# Patient Record
Sex: FEMALE | Race: WHITE | HISPANIC OR LATINO | Employment: UNEMPLOYED | URBAN - METROPOLITAN AREA
[De-identification: names, ages, dates, MRNs, and addresses within clinical notes are randomized per-mention and may not be internally consistent; named-entity substitution may affect disease eponyms.]

---
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
## 2023-01-31 NOTE — PROGRESS NOTES
Subjective:       Patient ID: Daina Maddox is a 17 y.o. female.    Chief Complaint:  Transfer ob      History of Present Illness  Patient presents to establish prenatal care.  Patient states she has had prenatal care in [place].  This is the patient's 1st pregnancy.  She reports that she was given a due date  making her about 35 and half weeks.  She denies having any problems in [place].  She admits to good fetal movement denies any vaginal bleeding or ruptured membranes.  Patient states she did not have a sugar diabetes test to her knowledge.    Menstrual History:  OB History        1    Para        Term                AB        Living           SAB        IAB        Ectopic        Multiple        Live Births                    Menarche age:  No LMP recorded. Patient is pregnant.         Review of Systems  Review of Systems   Constitutional: Negative for activity change, appetite change, chills, diaphoresis, fatigue, fever and unexpected weight change.   HENT: Negative for congestion, dental problem, drooling, ear discharge, ear pain, facial swelling, hearing loss, mouth sores, nosebleeds, postnasal drip, rhinorrhea, sinus pressure, sneezing, sore throat, tinnitus, trouble swallowing and voice change.    Eyes: Negative for photophobia, pain, discharge, redness, itching and visual disturbance.   Respiratory: Negative for apnea, cough, choking, chest tightness, shortness of breath, wheezing and stridor.    Cardiovascular: Negative for chest pain, palpitations and leg swelling.   Gastrointestinal: Negative for abdominal distention, abdominal pain, anal bleeding, blood in stool, constipation, diarrhea, nausea, rectal pain and vomiting.   Endocrine: Negative for cold intolerance, heat intolerance, polydipsia, polyphagia and polyuria.   Genitourinary: Negative for decreased urine volume, difficulty urinating, dyspareunia, dysuria, enuresis, flank pain, frequency, genital sores, hematuria, menstrual  problem, pelvic pain, urgency, vaginal bleeding, vaginal discharge and vaginal pain.   Musculoskeletal: Negative for arthralgias, back pain, gait problem, joint swelling, myalgias, neck pain and neck stiffness.   Skin: Negative for color change, pallor, rash and wound.   Allergic/Immunologic: Negative for environmental allergies, food allergies and immunocompromised state.   Neurological: Negative for dizziness, tremors, seizures, syncope, facial asymmetry, speech difficulty, weakness, light-headedness, numbness and headaches.   Hematological: Negative for adenopathy. Does not bruise/bleed easily.   Psychiatric/Behavioral: Negative for agitation, behavioral problems, confusion, decreased concentration, dysphoric mood, hallucinations, self-injury, sleep disturbance and suicidal ideas. The patient is not nervous/anxious and is not hyperactive.            Objective:      Physical Exam  Vitals and nursing note reviewed.   Constitutional:       Appearance: She is well-developed.   Neck:      Thyroid: No thyromegaly.   Cardiovascular:      Rate and Rhythm: Normal rate and regular rhythm.   Pulmonary:      Effort: Pulmonary effort is normal.      Breath sounds: Normal breath sounds.   Abdominal:      General: Bowel sounds are normal.      Palpations: Abdomen is soft. There is no mass.      Tenderness: There is no abdominal tenderness.      Hernia: There is no hernia in the left inguinal area.   Genitourinary:     Vagina: Normal. No foreign body. No vaginal discharge or tenderness.      Cervix: No cervical motion tenderness, discharge or friability.      Uterus: Not enlarged and not tender.       Adnexa:         Right: No mass, tenderness or fullness.          Left: No mass, tenderness or fullness.        Rectum: No external hemorrhoid.   Musculoskeletal:         General: Normal range of motion.   Skin:     General: Skin is dry.   Neurological:      Mental Status: She is alert and oriented to person, place, and time.       Deep Tendon Reflexes: Reflexes are normal and symmetric.   Psychiatric:         Behavior: Behavior normal.         Thought Content: Thought content normal.         Judgment: Judgment normal.             Assessment:        1. Encounter for supervision of normal first pregnancy in third trimester    2. Cervical cancer screening    3. Screen for STD (sexually transmitted disease)                Plan:         Daina was seen today for transfer ob.    Diagnoses and all orders for this visit:    Encounter for supervision of normal first pregnancy in third trimester  -     CBC Auto Differential; Future  -     Cystic Fibrosis Mutation Panel; Future  -     Hepatitis B Surface Antigen; Future  -     HIV 1/2 Ag/Ab (4th Gen); Future  -     RPR; Future  -     Rubella Antibody, IgG; Future  -     TSH; Future  -     Type & Screen; Future  -     US OB/GYN Procedure ([facility]) - Extended List; Future  -     OB Glucose Screen; Future    Cervical cancer screening  -     Liquid-Based Pap Smear, Screening    Screen for STD (sexually transmitted disease)  -     C. trachomatis/N. gonorrhoeae by AMP DNA [facility]; Cervix            Ultrasound Used Text: Patient has a location which was not amenable to ultrasound.

## 2024-07-16 ENCOUNTER — TELEPHONE (OUTPATIENT)
Dept: OBSTETRICS AND GYNECOLOGY | Facility: CLINIC | Age: 19
End: 2024-07-16
Payer: MEDICAID

## 2024-07-16 NOTE — TELEPHONE ENCOUNTER
Patient requesting appt with Dr Rosales for positive home UPT. LMP was last week of May. Appt scheduled with patient.

## 2024-07-16 NOTE — TELEPHONE ENCOUNTER
----- Message from Diana Webb sent at 7/16/2024  9:30 AM CDT -----  Contact: Self  Daina Maddox  MRN: [id number]  Home Phone      Not on file.  Work Phone      Not on file.  Mobile          [phone]    Patient Care Team:  Emily Templeton NP as PCP - General (Family Medicine)  Bella Mayo MA as Care Coordinator  OB? No  What phone number can you be reached at? [phone]  Message: positive home pregnancy test. Pt requested Dr Rosales, has seen Dr Mccallum before.

## 2024-07-19 ENCOUNTER — PROCEDURE VISIT (OUTPATIENT)
Dept: MATERNAL FETAL MEDICINE | Facility: CLINIC | Age: 19
End: 2024-07-19
Payer: MEDICAID

## 2024-07-19 ENCOUNTER — LAB VISIT (OUTPATIENT)
Dept: LAB | Facility: HOSPITAL | Age: 19
End: 2024-07-19
Attending: OBSTETRICS & GYNECOLOGY
Payer: MEDICAID

## 2024-07-19 ENCOUNTER — OFFICE VISIT (OUTPATIENT)
Dept: OBSTETRICS AND GYNECOLOGY | Facility: CLINIC | Age: 19
End: 2024-07-19
Payer: MEDICAID

## 2024-07-19 VITALS
SYSTOLIC BLOOD PRESSURE: 102 MMHG | HEART RATE: 73 BPM | RESPIRATION RATE: 12 BRPM | HEIGHT: 61 IN | DIASTOLIC BLOOD PRESSURE: 62 MMHG | OXYGEN SATURATION: 99 % | BODY MASS INDEX: 24.98 KG/M2 | WEIGHT: 132.31 LBS

## 2024-07-19 DIAGNOSIS — Z32.01 POSITIVE PREGNANCY TEST: ICD-10-CM

## 2024-07-19 DIAGNOSIS — N91.2 AMENORRHEA: Primary | ICD-10-CM

## 2024-07-19 DIAGNOSIS — Z11.3 SCREEN FOR STD (SEXUALLY TRANSMITTED DISEASE): ICD-10-CM

## 2024-07-19 DIAGNOSIS — N91.2 AMENORRHEA: ICD-10-CM

## 2024-07-19 LAB
ABO + RH BLD: NORMAL
ANION GAP SERPL CALC-SCNC: 8 MMOL/L (ref 8–16)
B-HCG UR QL: POSITIVE
BASOPHILS # BLD AUTO: 0.04 K/UL (ref 0–0.2)
BASOPHILS NFR BLD: 0.5 % (ref 0–1.9)
BLD GP AB SCN CELLS X3 SERPL QL: NORMAL
BUN SERPL-MCNC: 7 MG/DL (ref 6–20)
CALCIUM SERPL-MCNC: 9.3 MG/DL (ref 8.7–10.5)
CHLORIDE SERPL-SCNC: 106 MMOL/L (ref 95–110)
CO2 SERPL-SCNC: 23 MMOL/L (ref 23–29)
CREAT SERPL-MCNC: 0.8 MG/DL (ref 0.5–1.4)
CTP QC/QA: YES
DIFFERENTIAL METHOD BLD: NORMAL
EOSINOPHIL # BLD AUTO: 0.1 K/UL (ref 0–0.5)
EOSINOPHIL NFR BLD: 0.9 % (ref 0–8)
ERYTHROCYTE [DISTWIDTH] IN BLOOD BY AUTOMATED COUNT: 12.2 % (ref 11.5–14.5)
EST. GFR  (NO RACE VARIABLE): >60 ML/MIN/1.73 M^2
GLUCOSE SERPL-MCNC: 101 MG/DL (ref 70–110)
HBV SURFACE AG SERPL QL IA: NORMAL
HCG INTACT+B SERPL-ACNC: NORMAL MIU/ML
HCT VFR BLD AUTO: 38.5 % (ref 37–48.5)
HCV AB SERPL QL IA: NORMAL
HGB BLD-MCNC: 13.1 G/DL (ref 12–16)
HIV 1+2 AB+HIV1 P24 AG SERPL QL IA: NORMAL
IMM GRANULOCYTES # BLD AUTO: 0.02 K/UL (ref 0–0.04)
IMM GRANULOCYTES NFR BLD AUTO: 0.2 % (ref 0–0.5)
LYMPHOCYTES # BLD AUTO: 3.2 K/UL (ref 1–4.8)
LYMPHOCYTES NFR BLD: 37 % (ref 18–48)
MCH RBC QN AUTO: 29.6 PG (ref 27–31)
MCHC RBC AUTO-ENTMCNC: 34 G/DL (ref 32–36)
MCV RBC AUTO: 87 FL (ref 82–98)
MONOCYTES # BLD AUTO: 0.5 K/UL (ref 0.3–1)
MONOCYTES NFR BLD: 5.2 % (ref 4–15)
NEUTROPHILS # BLD AUTO: 4.8 K/UL (ref 1.8–7.7)
NEUTROPHILS NFR BLD: 56.2 % (ref 38–73)
NRBC BLD-RTO: 0 /100 WBC
PLATELET # BLD AUTO: 324 K/UL (ref 150–450)
PMV BLD AUTO: 9.9 FL (ref 9.2–12.9)
POTASSIUM SERPL-SCNC: 4 MMOL/L (ref 3.5–5.1)
RBC # BLD AUTO: 4.42 M/UL (ref 4–5.4)
SODIUM SERPL-SCNC: 137 MMOL/L (ref 136–145)
SPECIMEN OUTDATE: NORMAL
TREPONEMA PALLIDUM IGG+IGM AB [PRESENCE] IN SERUM OR PLASMA BY IMMUNOASSAY: NONREACTIVE
WBC # BLD AUTO: 8.59 K/UL (ref 3.9–12.7)

## 2024-07-19 PROCEDURE — 86901 BLOOD TYPING SEROLOGIC RH(D): CPT | Performed by: OBSTETRICS & GYNECOLOGY

## 2024-07-19 PROCEDURE — 87340 HEPATITIS B SURFACE AG IA: CPT | Performed by: OBSTETRICS & GYNECOLOGY

## 2024-07-19 PROCEDURE — 99213 OFFICE O/P EST LOW 20 MIN: CPT | Mod: PBBFAC,TH,25 | Performed by: OBSTETRICS & GYNECOLOGY

## 2024-07-19 PROCEDURE — 86803 HEPATITIS C AB TEST: CPT | Performed by: OBSTETRICS & GYNECOLOGY

## 2024-07-19 PROCEDURE — 87491 CHLMYD TRACH DNA AMP PROBE: CPT | Performed by: OBSTETRICS & GYNECOLOGY

## 2024-07-19 PROCEDURE — 84702 CHORIONIC GONADOTROPIN TEST: CPT | Performed by: OBSTETRICS & GYNECOLOGY

## 2024-07-19 PROCEDURE — 1159F MED LIST DOCD IN RCRD: CPT | Mod: CPTII,,, | Performed by: OBSTETRICS & GYNECOLOGY

## 2024-07-19 PROCEDURE — 3074F SYST BP LT 130 MM HG: CPT | Mod: CPTII,,, | Performed by: OBSTETRICS & GYNECOLOGY

## 2024-07-19 PROCEDURE — 76801 OB US < 14 WKS SINGLE FETUS: CPT | Mod: PBBFAC | Performed by: OBSTETRICS & GYNECOLOGY

## 2024-07-19 PROCEDURE — 80048 BASIC METABOLIC PNL TOTAL CA: CPT | Performed by: OBSTETRICS & GYNECOLOGY

## 2024-07-19 PROCEDURE — 86593 SYPHILIS TEST NON-TREP QUANT: CPT | Performed by: OBSTETRICS & GYNECOLOGY

## 2024-07-19 PROCEDURE — 36415 COLL VENOUS BLD VENIPUNCTURE: CPT | Performed by: OBSTETRICS & GYNECOLOGY

## 2024-07-19 PROCEDURE — 3078F DIAST BP <80 MM HG: CPT | Mod: CPTII,,, | Performed by: OBSTETRICS & GYNECOLOGY

## 2024-07-19 PROCEDURE — 3008F BODY MASS INDEX DOCD: CPT | Mod: CPTII,,, | Performed by: OBSTETRICS & GYNECOLOGY

## 2024-07-19 PROCEDURE — 99999PBSHW POCT URINE PREGNANCY: Mod: PBBFAC,,,

## 2024-07-19 PROCEDURE — 85025 COMPLETE CBC W/AUTO DIFF WBC: CPT | Performed by: OBSTETRICS & GYNECOLOGY

## 2024-07-19 PROCEDURE — 81025 URINE PREGNANCY TEST: CPT | Mod: PBBFAC | Performed by: OBSTETRICS & GYNECOLOGY

## 2024-07-19 PROCEDURE — 86900 BLOOD TYPING SEROLOGIC ABO: CPT | Performed by: OBSTETRICS & GYNECOLOGY

## 2024-07-19 PROCEDURE — 99999 PR PBB SHADOW E&M-EST. PATIENT-LVL III: CPT | Mod: PBBFAC,,, | Performed by: OBSTETRICS & GYNECOLOGY

## 2024-07-19 PROCEDURE — 99204 OFFICE O/P NEW MOD 45 MIN: CPT | Mod: 25,S$PBB,TH, | Performed by: OBSTETRICS & GYNECOLOGY

## 2024-07-19 PROCEDURE — 87389 HIV-1 AG W/HIV-1&-2 AB AG IA: CPT | Performed by: OBSTETRICS & GYNECOLOGY

## 2024-07-19 PROCEDURE — 86762 RUBELLA ANTIBODY: CPT | Performed by: OBSTETRICS & GYNECOLOGY

## 2024-07-19 PROCEDURE — 86850 RBC ANTIBODY SCREEN: CPT | Performed by: OBSTETRICS & GYNECOLOGY

## 2024-07-19 NOTE — PROGRESS NOTES
Subjective:   Patient ID: Daina Maddox is a 19 y.o. y.o. female.     Chief Complaint: Missed Menses       History of Present Illness:    Daina presents today complaining of amenorrhea. Patient's last menstrual period was 2024 (exact date).. Prior menstrual cycles have been regular. She reports positive home pregnancy test. UPT is positive.     This is the patient's second pregnancy. History of full term  at 40 6/7. No complaints.       History reviewed. No pertinent past medical history.  History reviewed. No pertinent surgical history.  Social History     Socioeconomic History    Marital status: Single   Tobacco Use    Smoking status: Never    Smokeless tobacco: Never   Substance and Sexual Activity    Alcohol use: Not Currently    Drug use: Never    Sexual activity: Yes     Partners: Male     Birth control/protection: None     Family History   Problem Relation Name Age of Onset    Breast cancer Neg Hx      Colon cancer Neg Hx      Ovarian cancer Neg Hx       OB History    Para Term  AB Living   2 1 1     1   SAB IAB Ectopic Multiple Live Births         0 1      # Outcome Date GA Lbr Jack/2nd Weight Sex Type Anes PTL Lv   2 Current            1 Term 20 40w6d 14:57 / 00:20 3.147 kg (6 lb 15 oz) M Vag-Spont EPI N ANDERS      Complications: Hand presentation         ROS:   Constitutional: Negative for chills, fever and weight loss.   HENT: Negative for congestion, ear discharge, hearing loss and nosebleeds.   Eyes: Negative for blurred vision and double vision.   Respiratory: Negative for cough, hemoptysis, sputum production and shortness of breath.   Cardiovascular: Negative for chest pain, palpitations and orthopnea.   Gastrointestinal: Negative for abdominal pain, heartburn, nausea and vomiting.   Genitourinary: Negative for dysuria, frequency, hematuria and urgency.   Musculoskeletal: Negative for back pain, myalgias and neck pain.   Skin: Negative for itching and rash.    Neurological: Negative for dizziness, tingling, tremors and headaches.   Endo/Heme/Allergies: Negative for environmental allergies and polydipsia. Does not bruise/bleed easily.   Psychiatric/Behavioral: Negative for depression, hallucinations and substance abuse. The patient is not nervous/anxious.       Objective:   Vital Signs:  Vitals:    07/19/24 1209   BP: 102/62   Pulse: 73   Resp: 12         Physical Exam:  General:  alert, cooperative, appears stated age   Skin:  Skin color, texture, turgor normal. No rashes or lesions   HEENT:  conjunctivae/corneas clear. PERRL.   Neck: supple, trachea midline, no adenopathy or thyromegally   Respiratory:  clear to auscultation bilaterally   Heart:  regular rate and rhythm, S1, S2 normal, no murmur, click, rub or gallop   Breasts:   deferred   Abdomen:  normal findings: bowel sounds normal, no masses palpable, no organomegaly and soft, non-tender   Pelvis: Deferred   Extremities: Normal ROM; no edema, no cyanosis   Neurologial: Normal strength and tone. No focal numbness or weakness. Reflexes 2+ and equal.   Psychiatric: normal mood, speech, dress, and thought processes     Assessment:      1. Amenorrhea    2. Screen for STD (sexually transmitted disease)    3. Positive pregnancy test          Plan:      Amenorrhea  -     POCT Urine Pregnancy  -     US OB/GYN Procedure ([facility]) - Extended List - Future; Future    Screen for STD (sexually transmitted disease)  -     C. trachomatis/N. gonorrhoeae by AMP DNA [facility]; Urine    Positive pregnancy test  -     Hepatitis C Antibody; Future; Expected date: 07/19/2024  -     HIV 1/2 Ag/Ab (4th Gen); Future; Expected date: 07/19/2024  -     RPR; Future; Expected date: 07/19/2024  -     Treponema Pallidium Antibodies IgG, IgM; Future; Expected date: 07/19/2024  -     Hepatitis B surface antigen; Future; Expected date: 07/19/2024  -     Type & Screen; Future; Expected date: 07/19/2024  -     Rubella antibody, IgG; Future; Expected  date: 07/19/2024  -     CBC auto differential; Future; Expected date: 07/19/2024  -     Basic metabolic panel; Future; Expected date: 07/19/2024  -     HCG, Quantitative; Future; Expected date: 07/19/2024      Dating scan   Labs  OTC PNV  Urine GC/CT  RTC in 4 weeks     Patient was counseled today on proper weight gain based on the [place] [facility]'s recommendations based on her pre-pregnancy weight. Discussed foods to avoid in pregnancy (i.e. sushi, fish that are high in mercury, deli meat, and unpasteurized cheeses). Discussed prenatal vitamin options (i.e. stool softener, DHA). She was also counseled on safe, healthy behavior as well as medications safe in pregnancy.

## 2024-07-22 LAB
RUBV IGG SER-ACNC: 38.4 IU/ML
RUBV IGG SER-IMP: REACTIVE

## 2024-07-23 LAB
C TRACH DNA SPEC QL NAA+PROBE: NOT DETECTED
N GONORRHOEA DNA SPEC QL NAA+PROBE: NOT DETECTED

## 2024-08-07 ENCOUNTER — TELEPHONE (OUTPATIENT)
Dept: OBSTETRICS AND GYNECOLOGY | Facility: CLINIC | Age: 19
End: 2024-08-07
Payer: MEDICAID

## 2024-08-08 ENCOUNTER — TELEPHONE (OUTPATIENT)
Dept: OBSTETRICS AND GYNECOLOGY | Facility: CLINIC | Age: 19
End: 2024-08-08
Payer: MEDICAID

## 2024-08-08 ENCOUNTER — HOSPITAL ENCOUNTER (EMERGENCY)
Facility: HOSPITAL | Age: 19
Discharge: HOME | End: 2024-08-08
Attending: SURGERY
Payer: MEDICAID

## 2024-08-08 VITALS
WEIGHT: 134.5 LBS | HEART RATE: 79 BPM | DIASTOLIC BLOOD PRESSURE: 81 MMHG | TEMPERATURE: 98 F | RESPIRATION RATE: 19 BRPM | OXYGEN SATURATION: 100 % | SYSTOLIC BLOOD PRESSURE: 115 MMHG | HEIGHT: 61 IN | BODY MASS INDEX: 25.39 KG/M2

## 2024-08-08 DIAGNOSIS — O03.9 FETAL DEMISE DUE TO MISCARRIAGE: Primary | ICD-10-CM

## 2024-08-08 LAB
ALBUMIN SERPL BCP-MCNC: 4.2 G/DL (ref 3.5–5.2)
ALP SERPL-CCNC: 53 U/L (ref 55–135)
ALT SERPL W/O P-5'-P-CCNC: 6 U/L (ref 10–44)
ANION GAP SERPL CALC-SCNC: 8 MMOL/L (ref 8–16)
AST SERPL-CCNC: 11 U/L (ref 10–40)
BASOPHILS # BLD AUTO: 0.05 K/UL (ref 0–0.2)
BASOPHILS NFR BLD: 0.6 % (ref 0–1.9)
BILIRUB SERPL-MCNC: 0.3 MG/DL (ref 0.1–1)
BILIRUB UR QL STRIP: NEGATIVE
BUN SERPL-MCNC: 12 MG/DL (ref 6–20)
CALCIUM SERPL-MCNC: 9.8 MG/DL (ref 8.7–10.5)
CHLORIDE SERPL-SCNC: 105 MMOL/L (ref 95–110)
CLARITY UR: CLEAR
CO2 SERPL-SCNC: 24 MMOL/L (ref 23–29)
COLOR UR: YELLOW
CREAT SERPL-MCNC: 0.8 MG/DL (ref 0.5–1.4)
DIFFERENTIAL METHOD BLD: NORMAL
EOSINOPHIL # BLD AUTO: 0.1 K/UL (ref 0–0.5)
EOSINOPHIL NFR BLD: 1.5 % (ref 0–8)
ERYTHROCYTE [DISTWIDTH] IN BLOOD BY AUTOMATED COUNT: 12.5 % (ref 11.5–14.5)
EST. GFR  (NO RACE VARIABLE): >60 ML/MIN/1.73 M^2
GLUCOSE SERPL-MCNC: 90 MG/DL (ref 70–110)
GLUCOSE UR QL STRIP: NEGATIVE
HCT VFR BLD AUTO: 37.7 % (ref 37–48.5)
HGB BLD-MCNC: 13.1 G/DL (ref 12–16)
HGB UR QL STRIP: NEGATIVE
IMM GRANULOCYTES # BLD AUTO: 0.01 K/UL (ref 0–0.04)
IMM GRANULOCYTES NFR BLD AUTO: 0.1 % (ref 0–0.5)
KETONES UR QL STRIP: NEGATIVE
LEUKOCYTE ESTERASE UR QL STRIP: NEGATIVE
LYMPHOCYTES # BLD AUTO: 3.2 K/UL (ref 1–4.8)
LYMPHOCYTES NFR BLD: 40.4 % (ref 18–48)
MCH RBC QN AUTO: 30.3 PG (ref 27–31)
MCHC RBC AUTO-ENTMCNC: 34.7 G/DL (ref 32–36)
MCV RBC AUTO: 87 FL (ref 82–98)
MONOCYTES # BLD AUTO: 0.7 K/UL (ref 0.3–1)
MONOCYTES NFR BLD: 8.3 % (ref 4–15)
NEUTROPHILS # BLD AUTO: 3.9 K/UL (ref 1.8–7.7)
NEUTROPHILS NFR BLD: 49.1 % (ref 38–73)
NITRITE UR QL STRIP: NEGATIVE
NRBC BLD-RTO: 0 /100 WBC
PH UR STRIP: >8 [PH] (ref 5–8)
PLATELET # BLD AUTO: 324 K/UL (ref 150–450)
PMV BLD AUTO: 9.7 FL (ref 9.2–12.9)
POTASSIUM SERPL-SCNC: 3.6 MMOL/L (ref 3.5–5.1)
PROT SERPL-MCNC: 7.7 G/DL (ref 6–8.4)
PROT UR QL STRIP: NEGATIVE
RBC # BLD AUTO: 4.33 M/UL (ref 4–5.4)
SODIUM SERPL-SCNC: 137 MMOL/L (ref 136–145)
SP GR UR STRIP: 1.02 (ref 1–1.03)
URN SPEC COLLECT METH UR: NORMAL
UROBILINOGEN UR STRIP-ACNC: 1 EU/DL
WBC # BLD AUTO: 8 K/UL (ref 3.9–12.7)

## 2024-08-08 PROCEDURE — 81003 URINALYSIS AUTO W/O SCOPE: CPT | Performed by: SURGERY

## 2024-08-08 PROCEDURE — 85025 COMPLETE CBC W/AUTO DIFF WBC: CPT | Performed by: SURGERY

## 2024-08-08 PROCEDURE — 80053 COMPREHEN METABOLIC PANEL: CPT | Performed by: SURGERY

## 2024-08-08 PROCEDURE — 99284 EMERGENCY DEPT VISIT MOD MDM: CPT | Mod: 25

## 2024-08-08 NOTE — ED PROVIDER NOTES
Encounter Date: 8/8/2024       History     Chief Complaint   Patient presents with    Vaginal Bleeding     TO ED WITH C/O OF VAGINAL BLEEDING WITH LOWER ABD PAIN SINCE YESTERDAY. PT IS 10 WEEKS GESTATION. UNKNOWN WHO HER OBGYN IS.      History of Present Illness  Daina Maddox is a 19 y.o. female that presents with the abdominal cramping  Patient states that she was abdominal cramping with spotting, patient pregnant  Patient states that she is in the first-trimester pregnancy, bleeding this evening  Patient has no active bleeding this time, denies any significant abdominal pain    The history is provided by the patient.     Review of patient's allergies indicates:  No Known Allergies  History reviewed. No pertinent past medical history.  History reviewed. No pertinent surgical history.  Family History   Problem Relation Name Age of Onset    Breast cancer Neg Hx      Colon cancer Neg Hx      Ovarian cancer Neg Hx       Social History     Tobacco Use    Smoking status: Never    Smokeless tobacco: Never   Substance Use Topics    Alcohol use: Not Currently    Drug use: Never     Review of Systems   Constitutional: Negative.    HENT: Negative.     Eyes: Negative.    Respiratory: Negative.     Cardiovascular: Negative.    Gastrointestinal: Negative.    Genitourinary:  Positive for vaginal bleeding.   Musculoskeletal: Negative.    Skin: Negative.    Neurological: Negative.    Psychiatric/Behavioral: Negative.         Physical Exam     Initial Vitals [08/08/24 0123]   BP Pulse Resp Temp SpO2   (!) 124/56 76 18 98 °F (36.7 °C) 100 %      MAP       --         Physical Exam    Nursing note and vitals reviewed.  Constitutional: Vital signs are normal. She appears well-developed and well-nourished. She is cooperative.   HENT:   Head: Normocephalic and atraumatic.   Eyes: Conjunctivae, EOM and lids are normal. Pupils are equal, round, and reactive to light.   Neck: Trachea normal and phonation normal. Neck supple. No JVD  present.   Normal range of motion.   Full passive range of motion without pain.     Cardiovascular:  Normal rate, regular rhythm, S1 normal, S2 normal, normal heart sounds, intact distal pulses and normal pulses.           Pulmonary/Chest: Effort normal and breath sounds normal.   Abdominal: Abdomen is soft and flat. Bowel sounds are normal.   Musculoskeletal:         General: Normal range of motion.      Cervical back: Full passive range of motion without pain, normal range of motion and neck supple.     Neurological: She is alert and oriented to person, place, and time. She has normal strength.   Skin: Skin is warm, dry and intact. Capillary refill takes less than 2 seconds.         ED Course   Procedures  Labs Reviewed   COMPREHENSIVE METABOLIC PANEL - Abnormal       Result Value    Sodium 137      Potassium 3.6      Chloride 105      CO2 24      Glucose 90      BUN 12      Creatinine 0.8      Calcium 9.8      Total Protein 7.7      Albumin 4.2      Total Bilirubin 0.3      Alkaline Phosphatase 53 (*)     AST 11      ALT 6 (*)     eGFR >60      Anion Gap 8     CBC W/ AUTO DIFFERENTIAL    WBC 8.00      RBC 4.33      Hemoglobin 13.1      Hematocrit 37.7      MCV 87      MCH 30.3      MCHC 34.7      RDW 12.5      Platelets 324      MPV 9.7      Immature Granulocytes 0.1      Gran # (ANC) 3.9      Immature Grans (Abs) 0.01      Lymph # 3.2      Mono # 0.7      Eos # 0.1      Baso # 0.05      nRBC 0      Gran % 49.1      Lymph % 40.4      Mono % 8.3      Eosinophil % 1.5      Basophil % 0.6      Differential Method Automated     URINALYSIS, REFLEX TO URINE CULTURE    Specimen UA Urine, Clean Catch      Color, UA Yellow      Appearance, UA Clear      pH, UA >8.0      Specific Gravity, UA 1.020      Protein, UA Negative      Glucose, UA Negative      Ketones, UA Negative      Bilirubin (UA) Negative      Occult Blood UA Negative      Nitrite, UA Negative      Urobilinogen, UA 1.0      Leukocytes, UA Negative       Narrative:     Specimen Source->Urine          Imaging Results               US OB <14 Wks, TransAbd, Single Gestation (Final result)  Result time 08/08/24 02:08:25      Final result by Brad Willoughby MD (08/08/24 02:08:25)                   Impression:      Fetal demise with estimated gestational age 7 weeks 2 days.  Based on crown-rump length measurement.    This report was flagged in Epic as abnormal.      Electronically signed by: Brad Willoughby MD  Date:    08/08/2024  Time:    02:08               Narrative:    EXAMINATION:  US OB <14 WEEKS TRANSABDOM, SINGLE GESTATION    CLINICAL HISTORY:  Bleeding in pregnancy;    TECHNIQUE:  Transabdominal sonography of the pelvis was performed, followed by transvaginal sonography to better evaluate the uterus and ovaries.    COMPARISON:  None.    FINDINGS:  Intrauterine gestation(s): Single irregular gestational sac containing irregular fetal pole.    Yolk sac: Absent.    Crown-rump length (CRL): 1.07 cm    Cardiac activity: Absent.    Subchorionic hemorrhage: 5.3 x 0.9 x 2.9 cm.    Right ovary: Not visualized.    Left ovary: 2.4 x 1.9 x 1.3 cm.    Miscellaneous: No Free Fluid.                                       Medications - No data to display  Medical Decision Making  Differential includes Subchorionic hemorrhage, miscarriage, bleeding NOS    Problems Addressed:  Fetal demise due to miscarriage: complicated acute illness or injury    Amount and/or Complexity of Data Reviewed  Labs: ordered. Decision-making details documented in ED Course.  Radiology: ordered and independent interpretation performed.    ED Management & Risks of Complication, Morbidity, & Mortality:  Miscarriage on ultrasound, patient was Rh (+) blood type  Patient will follow-up with OBGYN doctor Strayhorn tomorrow  Bleeding precautions given,  is translating tonight  Pt/Family counseled to return to the ER with any concerns on DC    Need for Hospitalization or Surgery with Social Determinants  of Health:  This patient does not need to be hospitalized on ER evaluation today  The patient's diagnosis is not limited by social determinants of health  Does not require surgery or procedure (major or minor), no risk factors    Clinical Impression:  Final diagnoses:  [O03.9] Fetal demise due to miscarriage (Primary)          ED Disposition Condition    Discharge Stable          ED Prescriptions    None       Follow-up Information       Follow up With Specialties Details Why Contact Info    Araseli Lainez MD Obstetrics and Gynecology Go in 1 day  [address] [place] [address]  [phone]               Fady Damon MD  08/08/24 0222

## 2024-08-09 ENCOUNTER — HOSPITAL ENCOUNTER (OUTPATIENT)
Facility: HOSPITAL | Age: 19
Discharge: HOME | End: 2024-08-09
Attending: OBSTETRICS & GYNECOLOGY | Admitting: OBSTETRICS & GYNECOLOGY
Payer: MEDICAID

## 2024-08-09 ENCOUNTER — OFFICE VISIT (OUTPATIENT)
Dept: OBSTETRICS AND GYNECOLOGY | Facility: CLINIC | Age: 19
End: 2024-08-09
Payer: MEDICAID

## 2024-08-09 ENCOUNTER — ANESTHESIA EVENT (OUTPATIENT)
Dept: SURGERY | Facility: HOSPITAL | Age: 19
End: 2024-08-09
Payer: MEDICAID

## 2024-08-09 ENCOUNTER — ANESTHESIA (OUTPATIENT)
Dept: SURGERY | Facility: HOSPITAL | Age: 19
End: 2024-08-09
Payer: MEDICAID

## 2024-08-09 VITALS
HEART RATE: 77 BPM | RESPIRATION RATE: 14 BRPM | WEIGHT: 134.56 LBS | HEIGHT: 61 IN | SYSTOLIC BLOOD PRESSURE: 108 MMHG | DIASTOLIC BLOOD PRESSURE: 60 MMHG | BODY MASS INDEX: 25.41 KG/M2 | OXYGEN SATURATION: 99 %

## 2024-08-09 VITALS
TEMPERATURE: 98 F | OXYGEN SATURATION: 100 % | HEART RATE: 68 BPM | RESPIRATION RATE: 16 BRPM | DIASTOLIC BLOOD PRESSURE: 58 MMHG | SYSTOLIC BLOOD PRESSURE: 118 MMHG

## 2024-08-09 DIAGNOSIS — O02.1 MISSED ABORTION: Primary | ICD-10-CM

## 2024-08-09 PROCEDURE — 25000003 PHARM REV CODE 250: Performed by: OBSTETRICS & GYNECOLOGY

## 2024-08-09 PROCEDURE — 36000704 HC OR TIME LEV I 1ST 15 MIN: Performed by: OBSTETRICS & GYNECOLOGY

## 2024-08-09 PROCEDURE — 25000003 PHARM REV CODE 250: Performed by: NURSE ANESTHETIST, CERTIFIED REGISTERED

## 2024-08-09 PROCEDURE — 88305 TISSUE EXAM BY PATHOLOGIST: CPT | Mod: 26,,, | Performed by: PATHOLOGY

## 2024-08-09 PROCEDURE — 37000009 HC ANESTHESIA EA ADD 15 MINS: Performed by: OBSTETRICS & GYNECOLOGY

## 2024-08-09 PROCEDURE — 88305 TISSUE EXAM BY PATHOLOGIST: CPT | Performed by: PATHOLOGY

## 2024-08-09 PROCEDURE — 99213 OFFICE O/P EST LOW 20 MIN: CPT | Mod: PBBFAC,TH | Performed by: OBSTETRICS & GYNECOLOGY

## 2024-08-09 PROCEDURE — 59820 CARE OF MISCARRIAGE: CPT | Mod: SPCCPT,,, | Performed by: OBSTETRICS & GYNECOLOGY

## 2024-08-09 PROCEDURE — 37000008 HC ANESTHESIA 1ST 15 MINUTES: Performed by: OBSTETRICS & GYNECOLOGY

## 2024-08-09 PROCEDURE — 71000039 HC RECOVERY, EACH ADD'L HOUR: Performed by: OBSTETRICS & GYNECOLOGY

## 2024-08-09 PROCEDURE — 99999 PR PBB SHADOW E&M-EST. PATIENT-LVL III: CPT | Mod: PBBFAC,,, | Performed by: OBSTETRICS & GYNECOLOGY

## 2024-08-09 PROCEDURE — 36000705 HC OR TIME LEV I EA ADD 15 MIN: Performed by: OBSTETRICS & GYNECOLOGY

## 2024-08-09 PROCEDURE — 71000033 HC RECOVERY, INTIAL HOUR: Performed by: OBSTETRICS & GYNECOLOGY

## 2024-08-09 PROCEDURE — 63600175 PHARM REV CODE 636 W HCPCS: Performed by: NURSE ANESTHETIST, CERTIFIED REGISTERED

## 2024-08-09 RX ORDER — PROPOFOL 10 MG/ML
INJECTION, EMULSION INTRAVENOUS
Status: DISCONTINUED | OUTPATIENT
Start: 2024-08-09 | End: 2024-08-09

## 2024-08-09 RX ORDER — FAMOTIDINE 20 MG/1
20 TABLET, FILM COATED ORAL
Status: CANCELLED | OUTPATIENT
Start: 2024-08-09

## 2024-08-09 RX ORDER — KETOROLAC TROMETHAMINE 30 MG/ML
INJECTION, SOLUTION INTRAMUSCULAR; INTRAVENOUS
Status: DISCONTINUED | OUTPATIENT
Start: 2024-08-09 | End: 2024-08-09

## 2024-08-09 RX ORDER — SODIUM CHLORIDE, SODIUM LACTATE, POTASSIUM CHLORIDE, CALCIUM CHLORIDE 600; 310; 30; 20 MG/100ML; MG/100ML; MG/100ML; MG/100ML
INJECTION, SOLUTION INTRAVENOUS CONTINUOUS
Status: CANCELLED | OUTPATIENT
Start: 2024-08-09

## 2024-08-09 RX ORDER — DOXYCYCLINE HYCLATE 100 MG
200 TABLET ORAL ONCE
Status: COMPLETED | OUTPATIENT
Start: 2024-08-09 | End: 2024-08-09

## 2024-08-09 RX ORDER — DIPHENHYDRAMINE HYDROCHLORIDE 50 MG/ML
25 INJECTION INTRAMUSCULAR; INTRAVENOUS EVERY 4 HOURS PRN
Status: DISCONTINUED | OUTPATIENT
Start: 2024-08-09 | End: 2024-08-09 | Stop reason: HOSPADM

## 2024-08-09 RX ORDER — SODIUM CHLORIDE, SODIUM LACTATE, POTASSIUM CHLORIDE, CALCIUM CHLORIDE 600; 310; 30; 20 MG/100ML; MG/100ML; MG/100ML; MG/100ML
INJECTION, SOLUTION INTRAVENOUS CONTINUOUS PRN
Status: DISCONTINUED | OUTPATIENT
Start: 2024-08-09 | End: 2024-08-09

## 2024-08-09 RX ORDER — FENTANYL CITRATE 50 UG/ML
INJECTION, SOLUTION INTRAMUSCULAR; INTRAVENOUS
Status: DISCONTINUED | OUTPATIENT
Start: 2024-08-09 | End: 2024-08-09

## 2024-08-09 RX ORDER — SODIUM CHLORIDE, SODIUM LACTATE, POTASSIUM CHLORIDE, CALCIUM CHLORIDE 600; 310; 30; 20 MG/100ML; MG/100ML; MG/100ML; MG/100ML
INJECTION, SOLUTION INTRAVENOUS CONTINUOUS
Status: DISCONTINUED | OUTPATIENT
Start: 2024-08-09 | End: 2024-08-09 | Stop reason: HOSPADM

## 2024-08-09 RX ORDER — DEXAMETHASONE SODIUM PHOSPHATE 4 MG/ML
INJECTION, SOLUTION INTRA-ARTICULAR; INTRALESIONAL; INTRAMUSCULAR; INTRAVENOUS; SOFT TISSUE
Status: DISCONTINUED | OUTPATIENT
Start: 2024-08-09 | End: 2024-08-09

## 2024-08-09 RX ORDER — MIDAZOLAM HYDROCHLORIDE 1 MG/ML
INJECTION INTRAMUSCULAR; INTRAVENOUS
Status: DISCONTINUED | OUTPATIENT
Start: 2024-08-09 | End: 2024-08-09

## 2024-08-09 RX ORDER — ONDANSETRON 8 MG/1
8 TABLET, ORALLY DISINTEGRATING ORAL EVERY 8 HOURS PRN
Status: DISCONTINUED | OUTPATIENT
Start: 2024-08-09 | End: 2024-08-09 | Stop reason: HOSPADM

## 2024-08-09 RX ORDER — ONDANSETRON HYDROCHLORIDE 2 MG/ML
INJECTION, SOLUTION INTRAVENOUS
Status: DISCONTINUED | OUTPATIENT
Start: 2024-08-09 | End: 2024-08-09

## 2024-08-09 RX ORDER — ACETAMINOPHEN 10 MG/ML
INJECTION, SOLUTION INTRAVENOUS
Status: DISCONTINUED | OUTPATIENT
Start: 2024-08-09 | End: 2024-08-09

## 2024-08-09 RX ORDER — IBUPROFEN 600 MG/1
600 TABLET ORAL EVERY 6 HOURS PRN
Status: DISCONTINUED | OUTPATIENT
Start: 2024-08-09 | End: 2024-08-09 | Stop reason: HOSPADM

## 2024-08-09 RX ORDER — LIDOCAINE HYDROCHLORIDE 20 MG/ML
INJECTION INTRAVENOUS
Status: DISCONTINUED | OUTPATIENT
Start: 2024-08-09 | End: 2024-08-09

## 2024-08-09 RX ORDER — IBUPROFEN 800 MG/1
800 TABLET ORAL EVERY 8 HOURS PRN
Qty: 20 TABLET | Refills: 0 | Status: SHIPPED | OUTPATIENT
Start: 2024-08-09

## 2024-08-09 RX ORDER — FAMOTIDINE 20 MG/1
20 TABLET, FILM COATED ORAL
Status: DISCONTINUED | OUTPATIENT
Start: 2024-08-09 | End: 2024-08-09 | Stop reason: HOSPADM

## 2024-08-09 RX ORDER — DIPHENHYDRAMINE HCL 25 MG
25 CAPSULE ORAL EVERY 4 HOURS PRN
Status: DISCONTINUED | OUTPATIENT
Start: 2024-08-09 | End: 2024-08-09 | Stop reason: HOSPADM

## 2024-08-09 RX ORDER — HYDROCODONE BITARTRATE AND ACETAMINOPHEN 5; 325 MG/1; MG/1
1 TABLET ORAL EVERY 4 HOURS PRN
Status: DISCONTINUED | OUTPATIENT
Start: 2024-08-09 | End: 2024-08-09 | Stop reason: HOSPADM

## 2024-08-09 RX ADMIN — DOXYCYCLINE 200 MG: 100 INJECTION, POWDER, LYOPHILIZED, FOR SOLUTION INTRAVENOUS at 01:08

## 2024-08-09 RX ADMIN — DEXAMETHASONE SODIUM PHOSPHATE 8 MG: 4 INJECTION, SOLUTION INTRAMUSCULAR; INTRAVENOUS at 01:08

## 2024-08-09 RX ADMIN — PROPOFOL 80 MG: 10 INJECTION, EMULSION INTRAVENOUS at 01:08

## 2024-08-09 RX ADMIN — ONDANSETRON 8 MG: 2 INJECTION, SOLUTION INTRAMUSCULAR; INTRAVENOUS at 01:08

## 2024-08-09 RX ADMIN — MIDAZOLAM HYDROCHLORIDE 2 MG: 1 INJECTION, SOLUTION INTRAMUSCULAR; INTRAVENOUS at 01:08

## 2024-08-09 RX ADMIN — ACETAMINOPHEN 1000 MG: 10 INJECTION, SOLUTION INTRAVENOUS at 01:08

## 2024-08-09 RX ADMIN — PROPOFOL 30 MG: 10 INJECTION, EMULSION INTRAVENOUS at 01:08

## 2024-08-09 RX ADMIN — DOXYCYCLINE HYCLATE 200 MG: 100 TABLET, COATED ORAL at 02:08

## 2024-08-09 RX ADMIN — KETOROLAC TROMETHAMINE 30 MG: 30 INJECTION, SOLUTION INTRAMUSCULAR at 01:08

## 2024-08-09 RX ADMIN — FENTANYL CITRATE 25 MCG: 50 INJECTION, SOLUTION INTRAMUSCULAR; INTRAVENOUS at 01:08

## 2024-08-09 RX ADMIN — FENTANYL CITRATE 50 MCG: 50 INJECTION, SOLUTION INTRAMUSCULAR; INTRAVENOUS at 01:08

## 2024-08-09 RX ADMIN — LIDOCAINE HYDROCHLORIDE 50 MG: 20 INJECTION, SOLUTION INTRAVENOUS at 01:08

## 2024-08-09 RX ADMIN — SODIUM CHLORIDE, SODIUM LACTATE, POTASSIUM CHLORIDE, AND CALCIUM CHLORIDE: .6; .31; .03; .02 INJECTION, SOLUTION INTRAVENOUS at 12:08

## 2024-08-09 NOTE — OP NOTE
Date: 08/09/2024    Procedure: Suction D&C    Surgeon: Araseli Lainez MD    Assistant: n/a    Pre-op Dx: Missed AB at 7 weeks    Post-op Dx: same    Anesthesia: MAC    EBL: <30 cc    DVT prophylaxis: SCDs    Perioperative antibiotics: Doxycycline    Specimen: Products of conception    Operative Findings: Normal appearing vulva and vagina. Uterus ~ 12 weeks.     OPERATIVE NOTE:  The patient was taken to the operating room were MAC anesthesia was found to be adequate. She was prepped and draped in the normal sterile fashion in the dorsal lithotomy position. An in and out catheter was used to drain the bladder. A bivalve speculum was inserted into the vagina and the cervix was visualized. The cervix was grasped with an allis clamp. The cervix was then dilated up to 8 mm with the Hegar dilatators. The suction catheter was inserted into the uterine cavity and activated. Multiple passes were made until a gritty texture of the endometrium was appreciated. A sharp curettage was also preformed.  All instruments were then removed. The patient tolerated the procedure well. Instrument, needle, and lap counts were correct x2. The patient was taken to recovery room in stable condition.

## 2024-08-09 NOTE — H&P
Daina Maddox is a 19 y.o. female  with a 7 week missed AB. She desires to proceed with a suction D&C for treatment.     ROS:  GENERAL: Denies weight gain or weight loss. Feeling well overall.   SKIN: Denies rash or lesions.   HEAD: Denies head injury or headache.   NODES: Denies enlarged lymph nodes.   CHEST: Denies chest pain or shortness of breath.   CARDIOVASCULAR: Denies palpitations or left sided chest pain.   ABDOMEN: No abdominal pain, constipation, diarrhea, nausea, vomiting or rectal bleeding.   URINARY: No frequency, dysuria, hematuria, or burning on urination.  REPRODUCTIVE: See HPI.   BREASTS: The patient performs breast self-examination and denies pain, lumps, or nipple discharge.   HEMATOLOGIC: No easy bruisability or excessive bleeding with the exception of menstrual cycles.  MUSCULOSKELETAL: Denies joint pain or swelling.   NEUROLOGIC: Denies syncope or weakness.   PSYCHIATRIC: Denies depression, anxiety or mood swings.    History reviewed. No pertinent past medical history.  History reviewed. No pertinent surgical history.  Family History   Problem Relation Name Age of Onset    Breast cancer Neg Hx      Colon cancer Neg Hx      Ovarian cancer Neg Hx       Review of patient's allergies indicates:  No Known Allergies  No current facility-administered medications for this encounter.  No outpatient medications have been marked as taking for the 24 encounter (Hospital Encounter).     Social History     Tobacco Use    Smoking status: Never    Smokeless tobacco: Never   Substance Use Topics    Alcohol use: Not Currently    Drug use: Never         Last ultrasound Fetal demise with estimated gestational age 7 weeks 2 days. Based on crown-rump length measurement.     Vitals:    24 1207   BP: 113/66     General Appearance: Alert, appropriate appearance for age. No acute distress, Chest/Respiratory Exam: Normal chest wall and respirations. Clear to auscultation.   Cardiovascular Exam:  regular rate and rhythm, S1, S2 normal, no murmur, click, rub or gallop   Gastrointestinal Exam: soft, non-tender; bowel sounds normal; no masses,  no organomegaly  Pelvic Exam Female: Exam deferred.   Psychiatric Exam: Alert and oriented, appropriate affect.    Assessment:   Missed AB at 7 weeks    Plan:   Suction D&C  Pre-op appt  Pre-op labs  Consents     I have discussed the risks, benefits, indications, and alternatives of the procedure in detail.  The patient verbalizes her understanding.  All questions answered.  Consents signed.  The patient agrees to proceed to proceed as planned.

## 2024-08-09 NOTE — DISCHARGE SUMMARY
[facility]  Obstetrics & Gynecology  Discharge Summary    Patient Name: Daina Maddox  MRN: [id number]  Admission Date: 2024  Hospital Length of Stay: 0 days  Discharge Date and Time:  2024 1:34 PM  Attending Physician: Araseli Lainez, *   Discharging Provider: Araseli Lainez MD  Primary Care Provider: Emily Templeton NP    HPI:  No notes on file    Hospital Course:  No notes on file    Goals of Care Treatment Preferences:  Code Status: Full Code      Procedure(s) (LRB):  DILATION AND CURETTAGE, UTERUS, USING SUCTION (N/A)         Significant Diagnostic Studies: Labs: CBC   Recent Labs   Lab 24  0129   WBC 8.00   HGB 13.1   HCT 37.7            Pending Diagnostic Studies:       Procedure Component Value Units Date/Time    Specimen to Pathology, Surgery Gynecology and Obstetrics [[id number]] Collected: 24 1320    Order Status: Sent Lab Status: In process Updated: 24 1320    Specimen: Tissue           Final Active Diagnoses:    Diagnosis Date Noted POA    PRINCIPAL PROBLEM:  Missed  [O02.1] 2024 Yes      Problems Resolved During this Admission:        Discharged Condition: good    Disposition: Home    Follow Up:   Follow-up Information       Araseli Lainez MD Follow up in 2 week(s).    Specialty: Obstetrics and Gynecology  Contact information:  [address] [place], [place] [address]MINERVA PRITCHETT [id number] [phone]                           Patient Instructions:      Diet general     Medications:  Reconciled Home Medications:      Medication List        START taking these medications      ibuprofen 800 MG tablet  Commonly known as: ADVIL,MOTRIN  Take 1 tablet (800 mg total) by mouth every 8 (eight) hours as needed for Pain.              Araseli Lainez MD  Obstetrics & Gynecology  [facility]

## 2024-08-13 LAB
FINAL PATHOLOGIC DIAGNOSIS: NORMAL
GROSS: NORMAL
Lab: NORMAL

## (undated) DEVICE — CATH RED RUBBER LATEX 14F 16IN

## (undated) DEVICE — Device

## (undated) DEVICE — SET DISPOSABLE COLLECTION

## (undated) DEVICE — TUBING SUC UNIV W/CONN 12FT

## (undated) DEVICE — VACURETTE 8MM CURVED